# Patient Record
Sex: MALE | Race: WHITE | NOT HISPANIC OR LATINO | URBAN - METROPOLITAN AREA
[De-identification: names, ages, dates, MRNs, and addresses within clinical notes are randomized per-mention and may not be internally consistent; named-entity substitution may affect disease eponyms.]

---

## 2024-04-02 ENCOUNTER — LAB (OUTPATIENT)
Dept: URBAN - METROPOLITAN AREA CLINIC 111 | Facility: CLINIC | Age: 19
End: 2024-04-02

## 2024-04-02 ENCOUNTER — OV NP (OUTPATIENT)
Dept: URBAN - METROPOLITAN AREA CLINIC 111 | Facility: CLINIC | Age: 19
End: 2024-04-02
Payer: COMMERCIAL

## 2024-04-02 VITALS
DIASTOLIC BLOOD PRESSURE: 73 MMHG | HEIGHT: 71 IN | SYSTOLIC BLOOD PRESSURE: 124 MMHG | TEMPERATURE: 98.2 F | BODY MASS INDEX: 31.47 KG/M2 | WEIGHT: 224.8 LBS | HEART RATE: 89 BPM

## 2024-04-02 DIAGNOSIS — R10.13 ABDOMINAL DISCOMFORT, EPIGASTRIC: ICD-10-CM

## 2024-04-02 DIAGNOSIS — D50.8 ACHLORHYDRIC ANEMIA: ICD-10-CM

## 2024-04-02 DIAGNOSIS — K92.1 MELENA: ICD-10-CM

## 2024-04-02 PROCEDURE — 99204 OFFICE O/P NEW MOD 45 MIN: CPT | Performed by: PHYSICIAN ASSISTANT

## 2024-04-02 NOTE — HPI-TODAY'S VISIT:
20 y/o male here with abd pain. Here with his mom. It started before CHRISTOPHER. Started in epigastrium and has radiated into rest of abdomen and chest. It happens about every other day. It happens when he wakes up. Eating does not trigger it. Some nausea but no vomiting. No weight loss. No decreased appetite. No dysphagia. No heartburn. No FH of GI cancer. Social ETOH. No tobacco.   Pt has been having stools more frequently. He went 5-6 times in a day and went 12 times once in a day. He had black tarry diarrhea in mid-February. No ER visits. He had abd U/S ordered by PCP that looked okay. He had labs last week but does not know the results. Pt has been alternating buprofen and Naproxen daily for at least 4 days of the week. He was taking these daily the week he had the black stool. Also c/o back pain. No h/o GI problems. PCP put him on PPI for 2 weeks but it did not help. He has been taking it once a week most recently.  RUQ u/s 2/2 was normal. Labs okay minus anemia. Hgb 11.3 and Hct 37.7. MCV low at 76.6.
Detail Level: Detailed
Moisturizers Recommendations: Fragrance free moisturizers

## 2024-04-09 ENCOUNTER — EGD (OUTPATIENT)
Dept: URBAN - METROPOLITAN AREA LAB 3 | Facility: LAB | Age: 19
End: 2024-04-09
Payer: COMMERCIAL

## 2024-04-09 DIAGNOSIS — K29.80 ACUTE DUODENITIS: ICD-10-CM

## 2024-04-09 DIAGNOSIS — K26.9 CHILDHOOD DUODENAL ULCER: ICD-10-CM

## 2024-04-09 DIAGNOSIS — K29.60 ADENOPAPILLOMATOSIS GASTRICA: ICD-10-CM

## 2024-04-09 PROBLEM — 51868009: Status: ACTIVE | Noted: 2024-04-09

## 2024-04-09 PROCEDURE — 43239 EGD BIOPSY SINGLE/MULTIPLE: CPT | Performed by: STUDENT IN AN ORGANIZED HEALTH CARE EDUCATION/TRAINING PROGRAM

## 2024-05-02 ENCOUNTER — OFFICE VISIT (OUTPATIENT)
Dept: URBAN - METROPOLITAN AREA CLINIC 111 | Facility: CLINIC | Age: 19
End: 2024-05-02
Payer: COMMERCIAL

## 2024-05-02 ENCOUNTER — DASHBOARD ENCOUNTERS (OUTPATIENT)
Age: 19
End: 2024-05-02

## 2024-05-02 VITALS
DIASTOLIC BLOOD PRESSURE: 66 MMHG | HEART RATE: 79 BPM | WEIGHT: 225.2 LBS | TEMPERATURE: 98.2 F | HEIGHT: 71 IN | BODY MASS INDEX: 31.53 KG/M2 | SYSTOLIC BLOOD PRESSURE: 108 MMHG

## 2024-05-02 DIAGNOSIS — K29.80 DUODENITIS: ICD-10-CM

## 2024-05-02 DIAGNOSIS — K26.9 DUODENAL ULCER: ICD-10-CM

## 2024-05-02 PROCEDURE — 99213 OFFICE O/P EST LOW 20 MIN: CPT | Performed by: PHYSICIAN ASSISTANT

## 2024-05-02 RX ORDER — PANTOPRAZOLE SODIUM 40 MG/1
1 TABLET TABLET, DELAYED RELEASE ORAL
Qty: 60 | Refills: 5 | Status: ACTIVE | COMMUNITY
Start: 2024-04-09

## 2024-05-02 RX ORDER — SUCRALFATE 1 G/1
1 TABLET ON AN EMPTY STOMACH TABLET ORAL
Qty: 60 | Refills: 3 | Status: ACTIVE | COMMUNITY
Start: 2024-04-09 | End: 2024-08-07

## 2024-05-03 ENCOUNTER — TELEPHONE ENCOUNTER (OUTPATIENT)
Dept: URBAN - METROPOLITAN AREA CLINIC 111 | Facility: CLINIC | Age: 19
End: 2024-05-03

## 2024-05-03 RX ORDER — PANTOPRAZOLE SODIUM 40 MG/1
1 TABLET TABLET, DELAYED RELEASE ORAL TWICE A DAY
Qty: 60 TABLET | Refills: 1